# Patient Record
Sex: FEMALE | Race: WHITE | NOT HISPANIC OR LATINO | Employment: UNEMPLOYED | URBAN - METROPOLITAN AREA
[De-identification: names, ages, dates, MRNs, and addresses within clinical notes are randomized per-mention and may not be internally consistent; named-entity substitution may affect disease eponyms.]

---

## 2023-04-03 ENCOUNTER — TELEPHONE (OUTPATIENT)
Dept: PRIMARY CARE | Facility: CLINIC | Age: 23
End: 2023-04-03
Payer: COMMERCIAL

## 2023-04-03 DIAGNOSIS — F90.9 ATTENTION DEFICIT HYPERACTIVITY DISORDER (ADHD), UNSPECIFIED ADHD TYPE: ICD-10-CM

## 2023-04-03 RX ORDER — ESCITALOPRAM OXALATE 20 MG/1
1 TABLET ORAL DAILY
COMMUNITY
Start: 2022-10-14 | End: 2024-01-08 | Stop reason: SDUPTHER

## 2023-04-03 RX ORDER — DEXTROAMPHETAMINE SACCHARATE, AMPHETAMINE ASPARTATE MONOHYDRATE, DEXTROAMPHETAMINE SULFATE AND AMPHETAMINE SULFATE 7.5; 7.5; 7.5; 7.5 MG/1; MG/1; MG/1; MG/1
30 CAPSULE, EXTENDED RELEASE ORAL
COMMUNITY
Start: 2022-08-16 | End: 2023-04-03 | Stop reason: SDUPTHER

## 2023-04-03 NOTE — TELEPHONE ENCOUNTER
Refill Adderall 30mg 1qd    Pharmacy: [facility] [phone]    LR: 02/13/23  LV: 02/23/23  NV: 04/04/23

## 2023-04-04 PROBLEM — N92.0 MENORRHAGIA WITH REGULAR CYCLE: Status: ACTIVE | Noted: 2023-04-04

## 2023-04-04 PROBLEM — B35.4 TINEA CORPORIS: Status: ACTIVE | Noted: 2023-04-04

## 2023-04-04 PROBLEM — N76.0 VAGINITIS: Status: ACTIVE | Noted: 2023-04-04

## 2023-04-04 PROBLEM — R63.5 ABNORMAL WEIGHT GAIN: Status: ACTIVE | Noted: 2023-04-04

## 2023-04-04 PROBLEM — F90.2 ADHD (ATTENTION DEFICIT HYPERACTIVITY DISORDER), COMBINED TYPE: Status: ACTIVE | Noted: 2023-04-04

## 2023-04-04 PROBLEM — Z20.822 ENCOUNTER FOR LABORATORY TESTING FOR COVID-19 VIRUS: Status: ACTIVE | Noted: 2023-04-04

## 2023-04-04 PROBLEM — F41.9 ANXIETY: Status: ACTIVE | Noted: 2023-04-04

## 2023-04-04 PROBLEM — F32.A DEPRESSION: Status: ACTIVE | Noted: 2023-04-04

## 2023-04-04 RX ORDER — DEXTROAMPHETAMINE SACCHARATE, AMPHETAMINE ASPARTATE MONOHYDRATE, DEXTROAMPHETAMINE SULFATE AND AMPHETAMINE SULFATE 7.5; 7.5; 7.5; 7.5 MG/1; MG/1; MG/1; MG/1
30 CAPSULE, EXTENDED RELEASE ORAL
Qty: 30 CAPSULE | Refills: 0 | Status: SHIPPED | OUTPATIENT
Start: 2023-04-04 | End: 2023-05-31 | Stop reason: SDUPTHER

## 2023-05-10 ENCOUNTER — OFFICE VISIT (OUTPATIENT)
Dept: PRIMARY CARE | Facility: CLINIC | Age: 23
End: 2023-05-10
Payer: COMMERCIAL

## 2023-05-10 VITALS
OXYGEN SATURATION: 99 % | HEART RATE: 74 BPM | TEMPERATURE: 96.6 F | HEIGHT: 62 IN | SYSTOLIC BLOOD PRESSURE: 100 MMHG | RESPIRATION RATE: 16 BRPM | DIASTOLIC BLOOD PRESSURE: 68 MMHG | WEIGHT: 189 LBS | BODY MASS INDEX: 34.78 KG/M2

## 2023-05-10 DIAGNOSIS — R10.13 EPIGASTRIC PAIN: Primary | ICD-10-CM

## 2023-05-10 DIAGNOSIS — R11.0 NAUSEA: ICD-10-CM

## 2023-05-10 PROCEDURE — 1036F TOBACCO NON-USER: CPT | Performed by: INTERNAL MEDICINE

## 2023-05-10 PROCEDURE — 93000 ELECTROCARDIOGRAM COMPLETE: CPT | Performed by: INTERNAL MEDICINE

## 2023-05-10 PROCEDURE — 99214 OFFICE O/P EST MOD 30 MIN: CPT | Performed by: INTERNAL MEDICINE

## 2023-05-10 RX ORDER — PANTOPRAZOLE SODIUM 20 MG/1
20 TABLET, DELAYED RELEASE ORAL
Qty: 30 TABLET | Refills: 11 | Status: SHIPPED | OUTPATIENT
Start: 2023-05-10 | End: 2024-01-08 | Stop reason: ALTCHOICE

## 2023-05-10 ASSESSMENT — PAIN SCALES - GENERAL: PAINLEVEL: 0-NO PAIN

## 2023-05-10 ASSESSMENT — PATIENT HEALTH QUESTIONNAIRE - PHQ9
2. FEELING DOWN, DEPRESSED OR HOPELESS: NOT AT ALL
SUM OF ALL RESPONSES TO PHQ9 QUESTIONS 1 AND 2: 0
1. LITTLE INTEREST OR PLEASURE IN DOING THINGS: NOT AT ALL

## 2023-05-10 NOTE — PROGRESS NOTES
Subjective   Patient ID: [id number]     Razia Pineda is a 22 y.o. female who presents for No chief complaint on file..    Current Outpatient Medications:     amphetamine-dextroamphetamine XR (Adderall XR) 30 mg 24 hr capsule, Take 1 capsule (30 mg) by mouth once daily., Disp: 30 capsule, Rfl: 0    escitalopram (Lexapro) 20 mg tablet, Take 1 tablet (20 mg) by mouth once daily., Disp: , Rfl:   HPI  22-year-old female patient presenting to the office today to discuss concerns regarding epigastric discomfort and nausea.  Patient stated that for the last 2 to 3 months she has been experiencing these episodes off and on but lately within the last few weeks she noticed worsening epigastric discomfort and nausea.  When she wakes up in the morning she feels some discomfort in her lower chest.  She has been complaining about heartburn as well.  No significant abdominal pain only some epigastric discomfort no clear acid reflux or regurgitation.  No vomiting no hematemesis no melena no dysphagia no odynophagia.  Her bowel movements are good and no blood in the stool.  Her appetite has been decreased.  Review of system was reviewed all normal except what is noted in HPI   Past Medical History:   Diagnosis Date    Cough, unspecified 12/03/2015    Cough    Excessive and frequent menstruation with regular cycle 05/04/2016    Menorrhagia with regular cycle    Personal history of other diseases of the respiratory system 12/13/2016    History of sore throat    Personal history of other diseases of the respiratory system 12/03/2015    History of acute sinusitis    Personal history of other infectious and parasitic diseases     History of respiratory syncytial virus (RSV) infection    Personal history of other infectious and parasitic diseases 05/04/2016    History of tinea corporis    Vitiligo 09/17/2015    Vitiligo      Objective   /68 (BP Location: Left arm, Patient Position: Sitting, BP Cuff Size: Adult)   Pulse 74   Temp  "35.9 °C (96.6 °F) (Temporal)   Resp 16   Ht 1.575 m (5' 2\")   Wt 85.7 kg (189 lb)   SpO2 99%   BMI 34.57 kg/m²      Physical Exam  Constitutional:       Appearance: Normal appearance.   Cardiovascular:      Rate and Rhythm: Normal rate and regular rhythm.      Pulses: Normal pulses.      Heart sounds: Normal heart sounds.   Pulmonary:      Effort: Pulmonary effort is normal.      Breath sounds: Normal breath sounds.   Abdominal:      General: Abdomen is flat. Bowel sounds are normal.      Palpations: Abdomen is soft.      Tenderness: There is abdominal tenderness.      Comments: Slight tenderness noted at the epigastric area   Musculoskeletal:      Cervical back: Normal range of motion and neck supple.   Neurological:      Mental Status: She is alert.         Assessment/Plan   Problem List Items Addressed This Visit    None  22-year-old patient with the following issues.    1.  Concerns regarding epigastric discomfort along with nausea and heartburn, at this point we are going to do a trial of proton pump inhibitor a prescription was sent to the pharmacy patient was instructed to take it 30 minutes before breakfast and I will see her back in 4 to 6 weeks for follow-up.  EKG was completed in the office and there was no ST segment changes and normal sinus rhythm no abnormalities or concerns.  All her questions were addressed no other active issues or concerns.    Linda Acharya MD   "

## 2023-05-10 NOTE — LETTER
May 10, 2023     Patient: Razia Pineda   YOB: 2000   Date of Visit: 5/10/2023       To Whom It May Concern:    Razia Pineda was seen in my clinic on 5/10/2023 at 12:10 pm. Please excuse Razia for her absence from work on this day to make the appointment.    If you have any questions or concerns, please don't hesitate to call.         Sincerely,         Linda Acharya MD

## 2023-05-31 DIAGNOSIS — F90.9 ATTENTION DEFICIT HYPERACTIVITY DISORDER (ADHD), UNSPECIFIED ADHD TYPE: ICD-10-CM

## 2023-05-31 NOTE — TELEPHONE ENCOUNTER
Refill:     Adderall ER 30mg daily    Pharm: [facility] [phone]    LR: 04/04/23 wty 30 no refills   LV: 05/10/23   NV: 06/08/23

## 2023-06-01 RX ORDER — DEXTROAMPHETAMINE SACCHARATE, AMPHETAMINE ASPARTATE MONOHYDRATE, DEXTROAMPHETAMINE SULFATE AND AMPHETAMINE SULFATE 7.5; 7.5; 7.5; 7.5 MG/1; MG/1; MG/1; MG/1
30 CAPSULE, EXTENDED RELEASE ORAL
Qty: 30 CAPSULE | Refills: 0 | Status: SHIPPED | OUTPATIENT
Start: 2023-06-01 | End: 2023-08-04 | Stop reason: SDUPTHER

## 2023-08-04 DIAGNOSIS — F90.9 ATTENTION DEFICIT HYPERACTIVITY DISORDER (ADHD), UNSPECIFIED ADHD TYPE: ICD-10-CM

## 2023-08-08 RX ORDER — DEXTROAMPHETAMINE SACCHARATE, AMPHETAMINE ASPARTATE MONOHYDRATE, DEXTROAMPHETAMINE SULFATE AND AMPHETAMINE SULFATE 7.5; 7.5; 7.5; 7.5 MG/1; MG/1; MG/1; MG/1
30 CAPSULE, EXTENDED RELEASE ORAL
Qty: 30 CAPSULE | Refills: 0 | Status: SHIPPED | OUTPATIENT
Start: 2023-08-08 | End: 2023-09-13 | Stop reason: SDUPTHER

## 2023-09-13 DIAGNOSIS — F90.9 ATTENTION DEFICIT HYPERACTIVITY DISORDER (ADHD), UNSPECIFIED ADHD TYPE: ICD-10-CM

## 2023-09-14 RX ORDER — DEXTROAMPHETAMINE SACCHARATE, AMPHETAMINE ASPARTATE MONOHYDRATE, DEXTROAMPHETAMINE SULFATE AND AMPHETAMINE SULFATE 7.5; 7.5; 7.5; 7.5 MG/1; MG/1; MG/1; MG/1
30 CAPSULE, EXTENDED RELEASE ORAL
Qty: 30 CAPSULE | Refills: 0 | Status: SHIPPED | OUTPATIENT
Start: 2023-09-14 | End: 2023-11-16 | Stop reason: SDUPTHER

## 2023-10-16 ENCOUNTER — OFFICE VISIT (OUTPATIENT)
Dept: URGENT CARE | Facility: CLINIC | Age: 23
End: 2023-10-16

## 2023-10-16 VITALS
HEART RATE: 82 BPM | SYSTOLIC BLOOD PRESSURE: 131 MMHG | OXYGEN SATURATION: 98 % | WEIGHT: 187 LBS | TEMPERATURE: 97.5 F | DIASTOLIC BLOOD PRESSURE: 86 MMHG | BODY MASS INDEX: 34.2 KG/M2 | RESPIRATION RATE: 16 BRPM

## 2023-10-16 DIAGNOSIS — J01.00 ACUTE NON-RECURRENT MAXILLARY SINUSITIS: Primary | ICD-10-CM

## 2023-10-16 PROCEDURE — 1036F TOBACCO NON-USER: CPT | Performed by: PHYSICIAN ASSISTANT

## 2023-10-16 PROCEDURE — 99204 OFFICE O/P NEW MOD 45 MIN: CPT | Performed by: PHYSICIAN ASSISTANT

## 2023-10-16 RX ORDER — AMOXICILLIN AND CLAVULANATE POTASSIUM 875; 125 MG/1; MG/1
1 TABLET, FILM COATED ORAL 2 TIMES DAILY
Qty: 14 TABLET | Refills: 0 | Status: SHIPPED | OUTPATIENT
Start: 2023-10-16 | End: 2023-10-23

## 2023-10-16 ASSESSMENT — PAIN SCALES - GENERAL: PAINLEVEL: 10-WORST PAIN EVER

## 2023-10-16 NOTE — LETTER
October 16, 2023     Patient: Razia Pineda   YOB: 2000   Date of Visit: 10/16/2023       To Whom It May Concern:    It is my medical opinion that Razia Pineda may return to work on 10/17/2023 .    If you have any questions or concerns, please don't hesitate to call.         Sincerely,        Jt Martinez PA-C    CC: No Recipients

## 2023-10-16 NOTE — PATIENT INSTRUCTIONS
Assessment/Plan   Problem List Items Addressed This Visit    None  Visit Diagnoses       Acute non-recurrent maxillary sinusitis    -  Primary    Relevant Medications    amoxicillin-pot clavulanate (Augmentin) 875-125 mg tablet           Recommending patient use Flonase 2 sprays each nostril twice per day for the sinus pressure and pain.  She may also use an over-the-counter antihistamine decongestant combo such as Advil cold and sinus

## 2023-10-16 NOTE — PROGRESS NOTES
Subjective   Patient ID: Razia Pineda is a 23 y.o. female who presents for Earache and URI (Sx for 1 week).    Patient notes that she has been having nasal congestion and sinus pain ongoing now for over a week and today she started to develop bilateral ear pain that she describes as excruciating, 10 out of 10.  She has not tried any over-the-counter medications despite this 10 out of 10 pain  Patient denies any fevers or chills any nausea vomiting diarrhea or abdominal pain    Past Medical History:   Diagnosis Date    Cough, unspecified 12/03/2015    Cough    Excessive and frequent menstruation with regular cycle 05/04/2016    Menorrhagia with regular cycle    Personal history of other diseases of the respiratory system 12/13/2016    History of sore throat    Personal history of other diseases of the respiratory system 12/03/2015    History of acute sinusitis    Personal history of other infectious and parasitic diseases     History of respiratory syncytial virus (RSV) infection    Personal history of other infectious and parasitic diseases 05/04/2016    History of tinea corporis    Vitiligo 09/17/2015    Vitiligo       The remainder of the systems were reviewed and are negative unless noted above      Objective   /86   Pulse 82   Temp 36.4 °C (97.5 °F)   Resp 16   Wt 84.8 kg (187 lb)   SpO2 98%   BMI 34.20 kg/m²   Physical Exam  Vitals reviewed.   Constitutional:       General: She is not in acute distress.     Appearance: Normal appearance. She is not toxic-appearing.   HENT:      Head: Normocephalic.      Comments: Tenderness to palpation over the maxillary sinuses bilaterally     Right Ear: Ear canal normal. No tenderness. No mastoid tenderness.      Left Ear: Ear canal normal. No tenderness. No mastoid tenderness.      Ears:      Comments: Serous fluid level behind bilateral TMs     Nose: Congestion and rhinorrhea present.      Mouth/Throat:      Mouth: Mucous membranes are moist.      Pharynx:  Oropharynx is clear. Posterior oropharyngeal erythema present.   Eyes:      Conjunctiva/sclera: Conjunctivae normal.      Pupils: Pupils are equal, round, and reactive to light.   Cardiovascular:      Rate and Rhythm: Normal rate and regular rhythm.      Heart sounds: No murmur heard.  Pulmonary:      Effort: No respiratory distress.      Breath sounds: No stridor. No wheezing, rhonchi or rales.   Chest:      Chest wall: No tenderness.   Abdominal:      Tenderness: There is no abdominal tenderness. There is no guarding.   Musculoskeletal:         General: Normal range of motion.   Skin:     General: Skin is warm and dry.      Capillary Refill: Capillary refill takes less than 2 seconds.      Findings: No erythema.   Neurological:      General: No focal deficit present.      Mental Status: She is alert.         Assessment/Plan   Problem List Items Addressed This Visit    None  Visit Diagnoses       Acute non-recurrent maxillary sinusitis    -  Primary    Relevant Medications    amoxicillin-pot clavulanate (Augmentin) 875-125 mg tablet           Recommending patient use Flonase 2 sprays each nostril twice per day for the sinus pressure and pain.  She may also use an over-the-counter antihistamine decongestant combo such as Advil cold and sinus

## 2023-11-16 DIAGNOSIS — F90.9 ATTENTION DEFICIT HYPERACTIVITY DISORDER (ADHD), UNSPECIFIED ADHD TYPE: ICD-10-CM

## 2023-11-16 RX ORDER — DEXTROAMPHETAMINE SACCHARATE, AMPHETAMINE ASPARTATE MONOHYDRATE, DEXTROAMPHETAMINE SULFATE AND AMPHETAMINE SULFATE 7.5; 7.5; 7.5; 7.5 MG/1; MG/1; MG/1; MG/1
30 CAPSULE, EXTENDED RELEASE ORAL
Qty: 30 CAPSULE | Refills: 0 | Status: SHIPPED | OUTPATIENT
Start: 2023-11-16 | End: 2024-01-08 | Stop reason: SDUPTHER

## 2023-11-16 NOTE — TELEPHONE ENCOUNTER
----- Message from Razia Pineda sent at 11/16/2023  1:52 PM EST -----  Regarding: Refill insurance changes  Contact: [phone]  Hi,   I am requesting a refill for adderall 30mg xr. i have been out for a month because I recently lost my insurance and had to get another one.   Thank you,   Razia Pineda

## 2024-01-08 ENCOUNTER — OFFICE VISIT (OUTPATIENT)
Dept: PRIMARY CARE | Facility: CLINIC | Age: 24
End: 2024-01-08
Payer: MEDICARE

## 2024-01-08 VITALS
TEMPERATURE: 97.2 F | SYSTOLIC BLOOD PRESSURE: 115 MMHG | WEIGHT: 197 LBS | DIASTOLIC BLOOD PRESSURE: 69 MMHG | HEART RATE: 75 BPM | BODY MASS INDEX: 36.25 KG/M2 | OXYGEN SATURATION: 99 % | HEIGHT: 62 IN

## 2024-01-08 DIAGNOSIS — F90.9 ATTENTION DEFICIT HYPERACTIVITY DISORDER (ADHD), UNSPECIFIED ADHD TYPE: ICD-10-CM

## 2024-01-08 DIAGNOSIS — F90.2 ADHD (ATTENTION DEFICIT HYPERACTIVITY DISORDER), COMBINED TYPE: Primary | Chronic | ICD-10-CM

## 2024-01-08 PROBLEM — N94.6 DYSMENORRHEA: Status: ACTIVE | Noted: 2023-04-26

## 2024-01-08 PROCEDURE — 80307 DRUG TEST PRSMV CHEM ANLYZR: CPT

## 2024-01-08 PROCEDURE — 80349 CANNABINOIDS NATURAL: CPT

## 2024-01-08 PROCEDURE — 99214 OFFICE O/P EST MOD 30 MIN: CPT | Performed by: NURSE PRACTITIONER

## 2024-01-08 PROCEDURE — 80324 DRUG SCREEN AMPHETAMINES 1/2: CPT

## 2024-01-08 PROCEDURE — 1036F TOBACCO NON-USER: CPT | Performed by: NURSE PRACTITIONER

## 2024-01-08 RX ORDER — DEXTROAMPHETAMINE SACCHARATE, AMPHETAMINE ASPARTATE MONOHYDRATE, DEXTROAMPHETAMINE SULFATE AND AMPHETAMINE SULFATE 7.5; 7.5; 7.5; 7.5 MG/1; MG/1; MG/1; MG/1
30 CAPSULE, EXTENDED RELEASE ORAL
Qty: 30 CAPSULE | Refills: 0 | Status: SHIPPED | OUTPATIENT
Start: 2024-01-08

## 2024-01-08 ASSESSMENT — PATIENT HEALTH QUESTIONNAIRE - PHQ9
2. FEELING DOWN, DEPRESSED OR HOPELESS: NOT AT ALL
1. LITTLE INTEREST OR PLEASURE IN DOING THINGS: NOT AT ALL
SUM OF ALL RESPONSES TO PHQ9 QUESTIONS 1 AND 2: 0

## 2024-01-08 NOTE — ASSESSMENT & PLAN NOTE
Well controlled on adderall.  CSA and UDS updated today  OARRS reviewed and no red flags.   Refill provided.

## 2024-01-08 NOTE — PROGRESS NOTES
"Subjective   Patient ID: Razia Pineda is a 23 y.o. female who presents for Med Refill.    OARRS:  Rajwinder Bassett, APRN-CNP on 1/8/2024  2:54 PM  I have personally reviewed the OARRS report for Razia Pineda. I have considered the risks of abuse, dependence, addiction and diversion    Is the patient prescribed a combination of a benzodiazepine and opioid?  No    Last Urine Drug Screen / ordered today: Yes  No results found for this or any previous visit (from the past 8760 hour(s)).  N/A        Controlled Substance Agreement:  Date of the Last Agreement: 01/08/2024  Reviewed Controlled Substance Agreement including but not limited to the benefits, risks, and alternatives to treatment with a Controlled Substance medication(s).    Stimulants:   What is the patient's goal of therapy? Improved concentration  Is this being achieved with current treatment? yes    Activities of Daily Living:   Is your overall impression that this patient is benefiting (symptom reduction outweighs side effects) from stimulant therapy? Yes     1. Physical Functioning: Better  2. Family Relationship: Better  3. Social Relationship: Better  4. Mood: Better  5. Sleep Patterns: Better  6. Overall Function: Better                 Review of Systems  otherwise negative aside from what was mentioned above in HPI.    Objective   /69   Pulse 75   Temp 36.2 °C (97.2 °F)   Ht 1.575 m (5' 2\")   Wt 89.4 kg (197 lb)   SpO2 99%   BMI 36.03 kg/m²     Physical Exam  Constitutional:       Appearance: Normal appearance.   Cardiovascular:      Rate and Rhythm: Normal rate and regular rhythm.   Pulmonary:      Effort: Pulmonary effort is normal.      Breath sounds: Normal breath sounds.   Abdominal:      General: Abdomen is flat.   Neurological:      General: No focal deficit present.      Mental Status: She is alert and oriented to person, place, and time. Mental status is at baseline.   Psychiatric:         Mood and Affect: Mood normal.         " Thought Content: Thought content normal.         Judgment: Judgment normal.         Assessment/Plan   Problem List Items Addressed This Visit             ICD-10-CM    ADHD (attention deficit hyperactivity disorder), combined type - Primary (Chronic) F90.2     Well controlled on adderall.  CSA and UDS updated today  OARRS reviewed and no red flags.   Refill provided.            Other Visit Diagnoses         Codes    Attention deficit hyperactivity disorder (ADHD), unspecified ADHD type     F90.9    Relevant Medications    amphetamine-dextroamphetamine XR (Adderall XR) 30 mg 24 hr capsule    Other Relevant Orders    Drug Screen, Urine With Reflex to Confirmation    Amphetamine Confirm, Urine

## 2024-01-09 LAB
AMPHETAMINES UR QL SCN: ABNORMAL
BARBITURATES UR QL SCN: ABNORMAL
BENZODIAZ UR QL SCN: ABNORMAL
BZE UR QL SCN: ABNORMAL
CANNABINOIDS UR QL SCN: ABNORMAL
FENTANYL+NORFENTANYL UR QL SCN: ABNORMAL
OPIATES UR QL SCN: ABNORMAL
OXYCODONE+OXYMORPHONE UR QL SCN: ABNORMAL
PCP UR QL SCN: ABNORMAL

## 2024-01-12 LAB
AMPHET UR-MCNC: >5000 NG/ML
AMPHET UR-MCNC: >5000 NG/ML
CARBOXYTHC UR-MCNC: 37 NG/ML
MDA UR-MCNC: <200 NG/ML
MDA UR-MCNC: <200 NG/ML
MDEA UR-MCNC: <200 NG/ML
MDEA UR-MCNC: <200 NG/ML
MDMA UR-MCNC: <200 NG/ML
MDMA UR-MCNC: <200 NG/ML
METHAMPHET UR-MCNC: <200 NG/ML
METHAMPHET UR-MCNC: <200 NG/ML
PHENTERMINE UR CFM-MCNC: <200 NG/ML
PHENTERMINE UR CFM-MCNC: <200 NG/ML

## 2024-03-08 DIAGNOSIS — F90.9 ATTENTION DEFICIT HYPERACTIVITY DISORDER (ADHD), UNSPECIFIED ADHD TYPE: ICD-10-CM

## 2024-03-08 RX ORDER — DEXTROAMPHETAMINE SACCHARATE, AMPHETAMINE ASPARTATE MONOHYDRATE, DEXTROAMPHETAMINE SULFATE AND AMPHETAMINE SULFATE 7.5; 7.5; 7.5; 7.5 MG/1; MG/1; MG/1; MG/1
30 CAPSULE, EXTENDED RELEASE ORAL
Qty: 30 CAPSULE | Refills: 0 | OUTPATIENT
Start: 2024-03-08

## 2024-03-08 NOTE — TELEPHONE ENCOUNTER
----- Message from Razia Pineda sent at 3/7/2024  4:09 PM EST -----  Regarding: medication  Contact: [phone]  I am requesting a refill for my adderall 30mg XR.      Thank You,   Razia Pineda

## 2024-07-09 ENCOUNTER — APPOINTMENT (OUTPATIENT)
Dept: PRIMARY CARE | Facility: CLINIC | Age: 24
End: 2024-07-09
Payer: MEDICARE